# Patient Record
(demographics unavailable — no encounter records)

---

## 2017-04-18 NOTE — [facility]
PROCEDURE 

Bilateral two-view hip 

 

HISTORY 

Bilateral hip pain, worse on the right. Osteoarthritis. Multiple falls. 

 

 

COMPARISON 

May 19, 2014. 

 

FINDINGS 

 

No evidence of acute fracture or bone destruction. Right and left hip 

joints are intact. 

Sclerosis is identified at pubic symphysis, may be slightly greater than 

on the prior study. There also may be slightly greater bone erosion, 

particularly at the superior aspect of the right pubic bone. 

 

IMPRESSION 

1. No acute findings at either hip.

2. Sclerosis and erosive changes at the pubic symphysis, appears to have 

mildly progressed since the prior study. This could indicate osteitis 

pubis or stress injury. Consider other inflammatory or even infectious 

etiology depending on clinical context.

 

 

 

Electronically signed by: Erwin Mercado MD (Apr 18, 2017 13:56:54)

## 2017-04-18 NOTE — [facility]
PROCEDURE 

Lumbar spine. 

 

HISTORY 

Osteoarthritis. Low back pain. Bilateral hip pain, greater on the right. 

Multiple falls. 

 

TECHNIQUE 

Lumbosacral spine including oblique views contains 5 images. 

 

COMPARISON 

September 30, 2016. 

 

FINDINGS 

Pedicle screw and richardson instrumentation with interbody bone cage at L4-L5 

appears similarly positioned. Decompression is noted. There is slight levo

curvature centered at L3. There is stable retrolisthesis at L3-L4. There 

is no fracture. Vertebral body height is maintained. Endplate spurring and

facet hypertrophy is relatively stable. 

 

IMPRESSION 

Stable radiographs of the lumbar spine. 

 

Electronically signed by: Santos Lima MD (Apr 18, 2017 16:42:54)

## 2017-06-14 NOTE — PHYS DOC
Past Medical History


Past Medical History:  Anxiety, Depression, Fibromyalgia, Hypertension, Other


Additional Past Medical Histor:  restless leg


Past Surgical History:  Cholecystectomy


Additional Past Surgical Histo:  BACK SX, FIBROMA REMOVED FROM RIGHT BREAST


Alcohol Use:  Rarely


Drug Use:  None





Adult General


Chief Complaint


Chief Complaint:  BACK PAIN - NO INJURY





HPI


HPI





Patient is a 67  year old female with a history significant for chronic lower 

back pain presents to the ER today secondary to severe pain to her back that 

started this evening. Patient reports that she has a history of hypertension 

diabetes, patient is status post cholecystectomy, patient reports chronic back 

pain she is status post lower back surgery back in 2011, patient denies any 

coronary disease, patient denies any other abdominal surgeries, patient reports 

this evening the pain started getting worse to the point where her Vicodin is 

no longer helping her. Patient reports that she saw Dr. Phan earlier today for 

routine visit and he put her on a new diabetes medication. Patient reports when 

she went home the pain started to increase to the point now where she is unable 

tolerate. Patient denies any weakness to her lower extremities. Patient denies 

any loss of bowel or bladder function. Patient denies any numbness or 

paresthesias to her perineal or rectal region. Patient reports that she at 

baseline has trouble walking secondary to balance problems. Patient reports 

that this is not any different currently. Patient reports she usually uses a 

walker at home. Patient has any fevers shakes chills nausea vomiting diarrhea 

chest pain shortness of breath or cough. Patient is currently requesting 

assistance to help her with managing her acute exacerbation of her chronic back 

pain.





Patient's physical exam was unremarkable. Patient's motor strength is 5 out of 

5 in her upper or lower extremity. Patient's sensation was normal in her upper 

or lower semis. Patient does have tenderness to palpation to her thoracic 

region on the right lower side around T11-T12. Patient's lungs were clear 

without any wheezing rales or rhonchi. Patient has no calf tenderness or edema. 

Patient has no Homans sign. Patient's neurological exam was completely normal.





Assessment and plan: 67-year-old female with acute exacerbation of chronic 

lower back pain. Patient was given IM Dilaudid, Toradol, and by mouth Flexeril 

and will be discharged home. Patient was instructed to call Dr. Phan in the 

morning if the pain still persists so that he can assist her with alternative 

pain management.





Review of Systems


Review of Systems





Constitutional: Denies fever or chills []


Eyes: Denies change in visual acuity, redness, or eye pain []


HENT: Denies nasal congestion or sore throat []


All other review systems are negative except as documented in the history of 

present illness portion.





Current Medications


Current Medications





Current Medications








 Medications


  (Trade)  Dose


 Ordered  Sig/Nkechi  Start Time


 Stop Time Status Last Admin


Dose Admin


 


 Cyclobenzaprine


 HCl


  (Flexeril)  10 mg  1X  ONCE  6/14/17 21:45


 6/14/17 21:46 DC 6/14/17 21:45


10 MG


 


 Hydromorphone HCl


  (Dilaudid)  1 mg  1X  ONCE  6/14/17 21:45


 6/14/17 21:46 DC 6/14/17 21:45


1 MG


 


 Ketorolac


 Tromethamine


  (Toradol)  30 mg  1X  ONCE  6/14/17 21:45


 6/14/17 21:46 DC 6/14/17 21:45


30 MG


 


 Ondansetron HCl


  (Zofran Odt)  4 mg  1X  ONCE  6/14/17 21:45


 6/14/17 21:46 DC 6/14/17 21:45


4 MG











Allergies


Allergies





Allergies








Coded Allergies Type Severity Reaction Last Updated Verified


 


  tetanus toxoid, adsorbed Allergy Intermediate Swelling 5/24/16 Yes


 


  codeine Adverse Reaction Mild NAUSEA AND VOMITING 5/24/16 No











Physical Exam


Physical Exam





Constitutional: Well developed, morbidly obese, no acute distress, non-toxic 

appearance. []


HENT: Normocephalic, atraumatic, bilateral external ears normal, oropharynx 

moist, no oral exudates, nose normal. []


Eyes: PERRLA, EOMI, conjunctiva normal, no discharge. [] 


Neck: Normal range of motion, no tenderness, supple, no stridor. [] 


Cardiovascular:Heart rate regular rhythm, 


Lungs & Thorax:  Bilateral breath sounds clear to auscultation []


Abdomen: Bowel sounds normal, soft, no tenderness, no masses, no pulsatile 

masses. [] 


Skin: Warm, dry, no erythema, no rash. [] 


Back: No tenderness, no CVA tenderness. [] 


Extremities: No tenderness, no cyanosis, no clubbing, ROM intact, no edema. [] 


Neurologic: Alert and oriented X 3, normal motor function, normal sensory 

function, no focal deficits noted. []


Psychologic: Affect normal, judgement normal, mood normal. []





Current Patient Data


Vital Signs





 Vital Signs








  Date Time  Temp Pulse Resp B/P (MAP) Pulse Ox O2 Delivery O2 Flow Rate FiO2


 


6/14/17 21:45   20     


 


6/14/17 21:16 98.4 71   92 Room Air  





 98.4       











EKG


EKG


[]





Radiology/Procedures


Radiology/Procedures


[]





Course & Med Decision Making


Course & Med Decision Making


Pertinent Labs and Imaging studies reviewed. (See chart for details)





[]





Dragon Disclaimer


Dragon Disclaimer


This electronic medical record was generated, in whole or in part, using a 

voice recognition dictation system.





Departure


Departure


Impression:  


 Primary Impression:  


 Chronic pain


 Additional Impression:  


 Low back pain


Disposition:  01 HOME, SELF-CARE


Condition:  IMPROVED


Referrals:  


TIMI PHAN MD (PCP)


Patient Instructions:  Back Pain, Adult, Chronic Back Pain, Chronic Pain 

Management





Additional Instructions:  


Please make an appointment to see Dr. Phan tomorrow if your pain has not 

resolved so that he can assist to with alternative pain management.





Problem Qualifiers








 Primary Impression:  


 Chronic pain


 Chronic pain type:  chronic pain syndrome  Qualified Codes:  G89.4 - Chronic 

pain syndrome


 Additional Impression:  


 Low back pain


 Chronicity:  acute  Back pain laterality:  right  Sciatica presence:  without 

sciatica  Qualified Codes:  M54.5 - Low back pain








JANET RICHARDSON MD Jun 14, 2017 22:27

## 2017-07-26 NOTE — [facility]
MRI of the cervical spine without contrast 7/26/2017

 

CLINICAL HISTORY: Neck pain with bilateral shoulder pain, left greater 

than right for 4 months

 

TECHNIQUE: Unenhanced T1-weighted, T2-weighted and inversion recovery 

sagittal and gradient echo and T2-weighted axial images of the cervical 

spine were obtained.

 

FINDINGS: Comparison is made to radiographs of the cervical spine dated 

9/30/2016.

 

There is straightening of the normal cervical lordosis. Degenerative 

signal changes are seen involving all of the disks of the cervical spine. 

Degenerative signal changes are seen within the marrow surrounding the 

C5-6 and C6-7 discs. Loss of height of these discs is noted. No area of 

abnormal signal intensity is seen involving the cervical spinal cord

 

On the axial images at the C2-3 disc space there is a minimal generalized 

disc bulge. There is a superimposed central/right paracentral disc disc 

protrusion which measures 3 mm in AP diameter. Degenerative changes are 

seen involving the uncovertebral and facet joints bilaterally. These 

findings do not result in significant central spinal canal or neural 

foraminal stenosis.

 

At the C3-4 disc space there is a minimal generalized disc bulge. 

Degenerative changes are seen involving the uncovertebral and facet joints

bilaterally. These findings do not result in significant central spinal 

canal or neural foraminal stenosis.

 

At the C4-5 disc space there is a mild generalized disc bulge. 

Degenerative changes are seen involving the uncovertebral and facet joints

bilaterally. These findings do not result in significant central spinal 

canal or neural foraminal stenosis.

 

At the C5-6 disc space there is a mild generalized disc bulge. 

Degenerative changes are seen involving the uncovertebral and facet 

joints, right greater than left. These findings efface the anterior CSF 

without resulting in significant central spinal canal stenosis. Mild right

neural foraminal stenosis is seen. The left neural foramen is patent.

 

At the C6-7 disc space there is a mild generalized disc bulge. 

Degenerative changes are seen involving the uncovertebral and facet joints

bilaterally. These findings do not result in significant central spinal 

canal or neural foraminal stenosis.

 

At the C7-T1 disc space there is a minimal generalized disc bulge. 

Degenerative changes are seen involving the facet joints bilaterally. 

These findings when combined do not result in significant central spinal 

canal or neural foraminal stenosis.

 

IMPRESSION: Degenerative changes are seen throughout the cervical spine. 

These findings do not result in significant central spinal canal stenosis 

at any level. Mild right neural foraminal stenosis at C5-6 is seen.

 

Electronically signed by: Lorenzo Trevino MD (7/26/2017 12:38 PM) [facility]

## 2017-08-07 NOTE — [facility]
MR of the left shoulder

 

Indication: Left shoulder pain and limited mobility. No known injury.

 

Technique: Standard multiplanar sequences are obtained.

 

Findings:

There is some motion degradation.

Acromioclavicular joint: Mildly degenerative, with undersurface 

osteophytes.

 

Rotator cuff: Complete full-thickness rupture of the supraspinatus tendon 

and anterior infraspinatus tendon, with retraction measuring 20 mm. 

Moderate muscle volume loss with mild fatty infiltration. Partial 

subscapularis tendon tear. Mild fluid in the subdeltoid bursa.

 

Glenohumeral cartilage: Mild primary osteoarthritis.

Fluid: No significant joint effusion.

Labrum: Limited examination due to the motion degradation. No obvious tear

but difficult to exclude.

 

Biceps tendon: Also poorly visualized due to the motion degradation.

 

Bones: No lesion or acute fracture.

 

Soft tissue: There is partial atrophy and fatty replacement of the teres 

minor muscle as can be seen with chronic quadrilateral space syndrome and 

denervation.

 

Impression:

1. Full-thickness rotator cuff tear of the supraspinatus and anterior 

infraspinatus tendon. 20 mm retraction. Partial subscapularis tendon tear.

2. Primary osteoarthritis.

 

Electronically signed by: Erwin Mercado MD (8/7/2017 12:30 PM) [facility]

## 2017-09-12 NOTE — EKG
[facility]

               [address] [place] [address]

Test Date:    2017               Test Time:    15:08:08

Pat Name:     RICCO CASILLAS              Department:   

Patient ID:   [id number]           Room:          

Gender:       F                        Technician:   

:          1950               Requested By: CHIKI MARROQUIN

Order Number: [id number]            Reading MD:     

                                 Measurements

Intervals                              Axis          

Rate:         81                       P:            21

OR:           246                      QRS:          -8

QRSD:         72                       T:            38

QT:           340                                    

QTc:          395                                    

                           Interpretive Statements

SINUS RHYTHM

PROLONGED OR INTERVAL

LEFTWARD AXIS

QRS(T) CONTOUR ABNORMALITY

CONSISTENT WITH ANTERIOR INFARCT

AGE UNDETERMINED

ABNORMAL ECG

RI6.01

Compared to ECG 2016 18:18:49

Left-axis deviation now present

Myocardial infarct finding now present

## 2017-09-12 NOTE — RAD
2 views the Chest  9/12/2017 4:47 PM



Indication: PRE OP CXR FOR LEFT TOTAL SHOULDER OPERATION, HX OF FIBROMYALGIA



Comparison:  Chest radiograph May 24, 2016



Findings: azygous lobe incidentally noted. There is a calcified granuloma in

the right lung base, stable. No pneumothorax or pleural effusion or new focal

infiltrate is identified. A moderate hiatal hernia including air-fluid level

is again noted. The heart is top normal in size. No acute osseous changes not

identified.



Impression: No evidence of acute cardiopulmonary process is identified.

## 2019-01-09 NOTE — SLEEP
DATE OF STUDY:  01/07/2019



ATTENDING PHYSICIAN:  Dr. Octaviano Nye.



The patient is 68-year-old who weighs 260 pounds and is with a BMI of 46.  The

patient had a sleep study in 2009, was positive for QUINCY and has been using CPAP

at 14 cm water.



Another reevaluation sleep study was performed.  During the night study, the

patient spent 417 minutes in bed and slept for 349 minutes with a sleep

efficiency of 84%.  Sleep latency was 37 minutes with a REM latency of 276

minutes.  Overall, sleep architecture showed normal stage 1 sleep, increased

stage 2 sleep, increased slow wave and reduced REM sleep.



During the night study, the patient had 13 obstructive apneas, no mixed apneas,

1 central apnea and 15 hypopneas.  The patient's apnea-hypopnea index was 5 per

hour, supine index 3 per hour and REM index of 17 per hour.



EKG monitoring revealed normal sinus rhythm, average heart rate 63 beats per

minute.  No arrhythmias observed.



Nocturnal oximetry study revealed a mean oxygen saturation of 94% with the

lowest of 87%.  10.7% of the time oxygen saturation remained between 80% and

89%.



PLMS were seen at index of 55 per hour and 2 per hour caused EEG arousals.



Due to low AHI, the patient did not meet the split night criteria for CPAP

initiation.



IMPRESSION:

1.  Mild sleep apnea-hypopnea syndrome with moderate increase during REM sleep. 

Total AHI of only 5 per hour with a REM AHI of 17 per hour.

2.  Mild nocturnal hypoxia secondary to obstructive sleep apnea.

3.  Severe PLMS.



RECOMMENDATIONS:

1.  The patient did not meet the split night criteria for CPAP initiation due to

low AHI.

2.  I would recommend weight loss as the initial form of treatment for patient's

sleep apnea, which is only mild.

3.  Avoid CNS depressants.

4.  The patient should also be further evaluated for symptoms of restless legs

during the day and if present, it can be treated with dopaminergic agonist

agents.

5.  If patient remains symptomatic despite weight loss, then treatment of mild

sleep apnea can be pursued with either oral appliance or a trial of CPAP

titration.

 



______________________________

YOVANA TORIBIO MD



DR:  YESSICA/shakira  JOB#:  [id number] / [id number]

DD:  01/09/2019 13:10  DT:  01/09/2019 13:22

## 2019-04-02 NOTE — RAD
Left knee, 3 views, 4/2/2019:

 

HISTORY: Knee pain and swelling, twisting injury

 

A total knee prosthesis is in place in satisfactory position. There is 

spurring along the margin of the medial femoral condyle. No fracture or 

dislocation is identified. There is a suggestion of a joint effusion.

 

IMPRESSION: No acute bony abnormality is detected.

 

Electronically signed by: Rick Moritz, MD (4/2/2019 9:47 AM) [facility]

## 2019-04-02 NOTE — PHYS DOC
Past Medical History


Past Medical History:  Anxiety, CHF, Depression, Fibromyalgia, GERD, High 

Cholesterol, Hypertension, Other


Additional Past Medical Histor:  restless leg,CHRONIC PAIN


Past Surgical History:  Cholecystectomy, Knee Replacement, Tubal ligation, Other


Additional Past Surgical Histo:  BACK SX, FIBROMA REMOVED FROM RIGHT BREAST


Alcohol Use:  Occasionally


Drug Use:  None





Adult General


Chief Complaint


Chief Complaint:  KNEE SWELLING





hospitals


HPI





Patient is a 68  year old female who was brought here by EMS for evaluation of 

left knee pain. Patient said last night she rolled out of her bed, she TWISTED  

her left knee, having left knee pain ever seen. Patient denies fALLING down or 

hit her knee on the floor.  Patient denies any head or neck injury. Patient 

says she had total knee on her left so she wANT TO make sure that her knee 

prosthesis is still in place.





Review of Systems


Review of Systems





Constitutional: Denies fever or chills []


Eyes: Denies change in visual acuity, redness, or eye pain []


HENT: Denies nasal congestion or sore throat []


Respiratory: Denies cough or shortness of breath []


Cardiovascular: No additional information not addressed in HPI []


GI: Denies abdominal pain, nausea, vomiting, bloody stools or diarrhea []


: Denies dysuria or hematuria []


Musculoskeletal: Denies back pain.  POSITIVE FOR LEFT KNEE PAIN.


Integument: Denies rash or skin lesions []


Neurologic: Denies headache, focal weakness or sensory changes []


Endocrine: Denies polyuria or polydipsia []





All other systems were reviewed and found to be within normal limits, except as 

documented in this note.





Allergies


Allergies





Allergies








Coded Allergies Type Severity Reaction Last Updated Verified


 


  tetanus toxoid, adsorbed Allergy Intermediate Swelling 9/26/17 Yes


 


  Statins-Hmg-Coa Reductase Inhibitor Allergy Mild  9/26/17 Yes


 


  lactose Adverse Reaction Intermediate LACTOSE INTOLERANT 9/27/17 Yes


 


  codeine Adverse Reaction Mild NAUSEA AND VOMITING 9/27/17 Yes











Physical Exam


Physical Exam





Constitutional: Well developed, well nourished, no acute distress, non-toxic 

appearance. []


HENT: Normocephalic, atraumatic, bilateral external ears normal, oropharynx 

moist, no oral exudates, nose normal. []


Eyes: PERRLA, EOMI, conjunctiva normal, no discharge. [] 


Neck: Normal range of motion, no tenderness, supple, no stridor. [] 


Cardiovascular:Heart rate regular rhythm, no murmur []


Lungs & Thorax:  Bilateral breath sounds clear to auscultation []


Abdomen: Bowel sounds normal, soft, no tenderness, no masses, no pulsatile 

masses. [] 


Skin: Warm, dry, no erythema, no rash. [] 


Back: No tenderness, no CVA tenderness. [] 


Extremities: No tenderness, no cyanosis, no clubbing, ROM intact, no edema. [] 


Neurologic: Alert and oriented X 3, normal motor function, normal sensory 

function, no focal deficits noted. []


Psychologic: Affect normal, judgement normal, mood normal. []





Current Patient Data


Vital Signs





 Vital Signs








  Date Time  Temp Pulse Resp B/P (MAP) Pulse Ox O2 Delivery O2 Flow Rate FiO2


 


4/2/19 11:00  58 22 144/67 (92) 93 Room Air  


 


4/2/19 09:10 98.4       





 98.4       











EKG


EKG


[]





Radiology/Procedures


Radiology/Procedures


xray of left knee: no acute disease[]





Course & Med Decision Making


Course & Med Decision Making


Pertinent Labs and Imaging studies reviewed. (See chart for details)





[]





Dragon Disclaimer


Dragon Disclaimer


This electronic medical record was generated, in whole or in part, using a 

voice recognition dictation system.





Departure


Departure


Impression:  


 Primary Impression:  


 Left knee sprain


Disposition:  01 HOME, SELF-CARE


Condition:  STABLE


Referrals:  


AMA PHAN MD (PCP)


follow up with your doctor next week for reevaluation


Patient Instructions:  Knee Sprain-Brief











CHLOÉ GLYNN DO Apr 2, 2019 10:15

## 2019-11-07 NOTE — RAD
MRI of the brain without contrast 11/7/2019

 

Clinical History: Speech difficulty, hypertension and headaches for last 2

weeks.

 

Technique: Unenhanced T1-weighted sagittal and axial, T2-weighted axial 

and coronal and FLAIR, gradient echo and diffusion-weighted axial images 

of the brain were obtained.

 

Findings: There is generalized parenchymal atrophy. Patchy and multiple 

small focal areas of increased signal intensity are seen within the 

periventricular and subcortical white matter of both cerebral hemispheres 

on the FLAIR and T2-weighted images consistent with areas of small vessel 

ischemic disease. No acute parenchymal abnormality is seen. No extra-axial

fluid collection is seen. There is no MRI evidence of acute 

ischemia/infarction.

 

Mild to moderate mucosal thickening in seen scattered throughout the 

paranasal sinuses. There are small bilateral mastoid effusions, right 

greater than left. Normal flow voids are seen within the major vascular 

structures surrounding the brain parenchyma.

 

Impression: No acute parenchymal abnormality is seen.

 

Electronically signed by: Lorenzo Trevino MD (11/7/2019 12:42 PM) [facility]